# Patient Record
Sex: FEMALE | Race: WHITE | NOT HISPANIC OR LATINO
[De-identification: names, ages, dates, MRNs, and addresses within clinical notes are randomized per-mention and may not be internally consistent; named-entity substitution may affect disease eponyms.]

---

## 2022-04-20 ENCOUNTER — APPOINTMENT (OUTPATIENT)
Dept: ORTHOPEDIC SURGERY | Facility: CLINIC | Age: 52
End: 2022-04-20
Payer: COMMERCIAL

## 2022-04-20 ENCOUNTER — APPOINTMENT (OUTPATIENT)
Dept: MRI IMAGING | Facility: CLINIC | Age: 52
End: 2022-04-20
Payer: COMMERCIAL

## 2022-04-20 VITALS — WEIGHT: 150 LBS | HEIGHT: 66 IN | BODY MASS INDEX: 24.11 KG/M2

## 2022-04-20 DIAGNOSIS — Z87.891 PERSONAL HISTORY OF NICOTINE DEPENDENCE: ICD-10-CM

## 2022-04-20 DIAGNOSIS — M50.90 CERVICAL DISC DISORDER, UNSPECIFIED, UNSPECIFIED CERVICAL REGION: ICD-10-CM

## 2022-04-20 DIAGNOSIS — Z78.9 OTHER SPECIFIED HEALTH STATUS: ICD-10-CM

## 2022-04-20 PROBLEM — Z00.00 ENCOUNTER FOR PREVENTIVE HEALTH EXAMINATION: Status: ACTIVE | Noted: 2022-04-20

## 2022-04-20 PROCEDURE — 72141 MRI NECK SPINE W/O DYE: CPT

## 2022-04-20 PROCEDURE — 99204 OFFICE O/P NEW MOD 45 MIN: CPT

## 2022-04-20 RX ORDER — METHYLPREDNISOLONE 4 MG/1
4 TABLET ORAL
Qty: 1 | Refills: 0 | Status: ACTIVE | COMMUNITY
Start: 2022-04-20 | End: 1900-01-01

## 2022-04-20 RX ORDER — NAPROXEN 500 MG/1
500 TABLET ORAL
Refills: 0 | Status: ACTIVE | COMMUNITY

## 2022-04-20 RX ORDER — METAXALONE 800 MG/1
800 TABLET ORAL
Refills: 0 | Status: ACTIVE | COMMUNITY

## 2022-04-20 NOTE — HISTORY OF PRESENT ILLNESS
[Neck] : neck [Upper back] : upper back [Left Arm] : left arm [Gradual] : gradual [Result of repetitive motion] : result of repetitive motion [9] : 9 [6] : 6 [Burning] : burning [Dull/Aching] : dull/aching [Radiating] : radiating [Shooting] : shooting [Constant] : constant [Nothing helps with pain getting better] : Nothing helps with pain getting better [Standing] : standing [de-identified] : History of c spine and l spine pain, saw Pain mng in the past and had Epidural more tan 5 years ago \par Pain start again and went to [facility], Had Xray on 4/10/22, went to  and Came back 2-3 days ago \par \par Ms. BRADEN is a 51 year female who presents today for evaluation of their Neck pain and bilateral arm pain and left sided arm numbness. She does have a history of herniated discs in the cervical spine which were managed by numerous injections by a pain management doctor a few years ago. She feels that a few days ago she was cleaning and doing more activities around her home and woke up the next day with moderate to severe neck and back pain radiating into her upper extremities. She does feel that the pain has minimally improved with an anti inflammatory any muscle relaxant. She denies any change in vision she denies any bowel or bladder dysfunction or any tripping or falling. She does feel a sense of numbness in her upper extremities.\par  [] : no [FreeTextEntry1] : shoulder [de-identified] : xray

## 2022-04-20 NOTE — PHYSICAL EXAM
[Flexion] : flexion [Extension] : extension [Rotation to left] : rotation to left [Rotation to right] : rotation to right [4___] : right grasp 4[unfilled]/5 [] : sensation of right upper extremities intact [Biceps 2+] : biceps 2+ [Triceps 2+] : triceps 2+ [Brachioradialis 2+] : brachioradialis 2+ [FreeTextEntry1] : straightening of the c-spine wit facet arthropathy  and disc space narrowing of c4-5, 5-6, 6-7

## 2022-04-20 NOTE — ASSESSMENT
[FreeTextEntry1] : After her examination today in the office and review of her radiographs I do think she has a re exacerbating of her neck pain and she does have a history of multiple herniated discs in her cervical spine however she does not have any history of any new trauma or injury to the neck or lower back however as a result of her nerve symptoms I would like to order a stat MRI and have her follow up with a spine specialist who can review the MRI with her and discuss either conservative treatment with cortisone injections versus discussing with her surgical intervention which may include discectomy's and stabilization or fusion of the cervical spine if necessary. I've given her a prescription for a medrol dose pack as she has tried anti inflammatory muscle relaxants which have minimally improved her symptoms and she will be scheduled for a stat MRI as well as a spine follow up over the next day or two period if her pain worsens or if she begins to have diffus weakness in her upper extremities or lower extremities or any bowel or bladder dysfunctions or change in her vision or significantly worsening pain than she should present to the emergency room

## 2022-04-21 ENCOUNTER — APPOINTMENT (OUTPATIENT)
Dept: ORTHOPEDIC SURGERY | Facility: CLINIC | Age: 52
End: 2022-04-21
Payer: COMMERCIAL

## 2022-04-21 VITALS — BODY MASS INDEX: 23.54 KG/M2 | HEIGHT: 67 IN | WEIGHT: 150 LBS

## 2022-04-21 DIAGNOSIS — M54.12 RADICULOPATHY, CERVICAL REGION: ICD-10-CM

## 2022-04-21 PROCEDURE — 99214 OFFICE O/P EST MOD 30 MIN: CPT

## 2022-04-21 PROCEDURE — 99204 OFFICE O/P NEW MOD 45 MIN: CPT

## 2022-04-21 RX ORDER — GABAPENTIN 100 MG/1
100 CAPSULE ORAL 3 TIMES DAILY
Qty: 90 | Refills: 1 | Status: ACTIVE | COMMUNITY
Start: 2022-04-21 | End: 1900-01-01

## 2022-04-21 RX ORDER — NAPROXEN 500 MG/1
500 TABLET ORAL
Qty: 60 | Refills: 0 | Status: ACTIVE | COMMUNITY
Start: 2022-04-21 | End: 1900-01-01

## 2022-04-21 NOTE — ASSESSMENT
[FreeTextEntry1] : LUE radic\par C/w medrol, switch to claude, NSAIDs after MDP\par Pain c/s to discuss [facility] again\par Re-initiate PT\par \par Gabapentin- Patient advised of sedating effects, instructed not to drive, operate machinery, or take with other sedating medications. Advised of need to taper on/off medication and risk of abruptly stopping gabapentin. \par NSAIDs- Patient warned of risk of medication to GI tract, increased blood pressure, cardiac risk, and risk of fluid retention.  Advised to clear medication with internist or PCP if any concurrent health problem with heart, blood pressure, or GI system exists.

## 2022-04-21 NOTE — REVIEW OF SYSTEMS
[Joint Pain] : joint pain [Negative] : Endocrine [Arthralgia] : no arthralgia [Joint Stiffness] : no joint stiffness [Joint Swelling] : no joint swelling [Chills] : no chills

## 2022-04-21 NOTE — HISTORY OF PRESENT ILLNESS
[10] : 10 [de-identified] : C MRI 4/20/22- no formal report available yet.\par Ind. review- \par C4/5 bugle with central HNP and b/l NF narrowing;\par C5/6 bulge with L paracentral and R foraminal HNP and severe NF narrowing;\par C6/7 bulge with L paracentral component and severe L>R NF narrowing\par \par Pt seen by non-spine partners in the past \par \par 04/21/2022 - 51 year old  RHD female presents for constant worsening severe neck pain radiating down LUE to fingers, with associated N/T, X 12 days. Patient woke up with pain; denies specific injury. Previously seen by Dr. Todd on 4/20/22, who ordered MRI completed last night.  Unable to sleep due to pain. Has had HILARY with Dr. Browne in 2015/Physical therapy several years ago for a similar pain episode, with relief. Denies prior cervical injuries/surgeries. \par No PMH/PSH. No t/drugs. Occ etoh.  [FreeTextEntry1] : C-SPINE

## 2022-04-21 NOTE — IMAGING
[de-identified] : Inspection: No rash or ecchymosis \par Palpation: Midline cervical and b/l rhomboid tenderness to palpation. L trapezial TTP.  \par ROM: Diminished in all planes. \par Strength: 5/5 bilateral deltoid, biceps, triceps, wrist flexors, wrist extensors, , abductors \par Sensation: Sensation present to light touch bilateral C5-T1 distributions \par Reflexes: Negative Rodriguez's bilaterally\par Shoulders: Full ROM without pain. \par

## 2022-04-26 ENCOUNTER — APPOINTMENT (OUTPATIENT)
Dept: PAIN MANAGEMENT | Facility: CLINIC | Age: 52
End: 2022-04-26
Payer: COMMERCIAL

## 2022-04-26 VITALS — BODY MASS INDEX: 23.23 KG/M2 | WEIGHT: 148 LBS | HEIGHT: 67 IN

## 2022-04-26 PROCEDURE — 99204 OFFICE O/P NEW MOD 45 MIN: CPT

## 2022-04-26 NOTE — HISTORY OF PRESENT ILLNESS
[Neck] : neck [9] : 9 [Sharp] : sharp [Tingling] : tingling [Constant] : constant [Meds] : meds [Sitting] : sitting [Standing] : standing [Walking] : walking [FreeTextEntry1] : Neck pain with radiation to LUE. Pain began approx. APril 8th , sudden onset, no known trigger event. Prior episode approx. 7 years ago had an injection ( epidural ) by me and was well until this episode began. She has used topical ketamine cream in the past which has been helpful but she is essentially out of it prescribed 7 years ago. New MRI reviewed. Pain is worse with sitting and driving. Supine and topical meds improve it. MDP was of little help. Tylenol is of some help. Gabapentin helps as well.  [] : Post Surgical Visit: no [FreeTextEntry6] : numbness  [FreeTextEntry7] : left side  [FreeTextEntry9] : laying down  [de-identified] : 7 years ago  [de-identified] : 7 years ago  [de-identified] : mri

## 2022-04-26 NOTE — CONSULT LETTER
[Dear  ___] : Dear  [unfilled], [Consult Letter:] : I had the pleasure of evaluating your patient, [unfilled]. [Please see my note below.] : Please see my note below. [Consult Closing:] : Thank you very much for allowing me to participate in the care of this patient.  If you have any questions, please do not hesitate to contact me. [Sincerely,] : Sincerely, [FreeTextEntry3] : Charles Browne MD\par

## 2022-04-26 NOTE — DISCUSSION/SUMMARY
[Surgical risks reviewed] : Surgical risks reviewed [de-identified] : After discussing various treatment options with the patient including but not limited to oral medications, physical therapy, exercise,\par modalities as well as interventional spinal injections, we have decided with the following plan\par \par I personally reviewed the MRI/CT scan images and agree with the radiologist's report. The\par radiological findings were discussed with the patient.\par \par \par The risks, benefits, contents and alternatives to injection were explained in full to the patient. Risks outlined include but are not\par limited to infection,sepsis, bleeding, post-dural puncture headache, nerve damage, temporary increase in pain, syncopal episode,\par failure to resolve symptoms, allergic reaction, symptom recurrence, and elevation of blood sugar in diabetics. Cortisone may cause\par immunosuppression. Patient understands the risks. All questions were answered. After discussion of options, patient requested\par an injection. Information regarding the injection was given to the patient. Which medications to stop prior to the injection was\par explained to the patient as well.\par \par Follow up in 1-2 weeks post injection for re-evaluation.\par \par  Conservative Care\par Continue Home exercises, stretching, activity modification, physical therapy, and conservative care.\par

## 2022-04-26 NOTE — PROCEDURE
[Trigger point 1-2 muscle groups] : trigger point 1-2 muscle groups [Bilateral] : bilaterally of the [Cervical paraspinal muscle] : cervical paraspinal muscle [Trapezius muscle] : trapezius muscle [Pain] : pain [Inflammation] : inflammation [Alcohol] : alcohol [Ethyl Chloride sprayed topically] : ethyl chloride sprayed topically [Sterile technique used] : sterile technique used [___ cc    1%] : Lidocaine ~Vcc of 1%  [___ cc    10mg] : Triamcinolone (Kenalog) ~Vcc of 10 mg  [] : Patient tolerated procedure well [Call if redness, pain or fever occur] : call if redness, pain or fever occur

## 2022-04-26 NOTE — PHYSICAL EXAM
[Normal Coordination] : normal coordination [Normal DTR UE/LE] : normal DTR UE/LE  [Normal Sensation] : normal sensation [Normal Mood and Affect] : normal mood and affect [Orientated] : orientated [Able to Communicate] : able to communicate [Normal Skin] : normal skin [No Rash] : no rash [No Ulcers] : no ulcers [No Lesions] : no lesions [No obvious lymphadenopathy in areas examined] : no obvious lymphadenopathy in areas examined [Well Developed] : well developed [Well Nourished] : well nourished [No Respiratory Distress] : no respiratory distress [Rotation to left] : rotation to left [] : positive Spurling

## 2022-04-27 ENCOUNTER — APPOINTMENT (OUTPATIENT)
Dept: PAIN MANAGEMENT | Facility: CLINIC | Age: 52
End: 2022-04-27
Payer: COMMERCIAL

## 2022-04-27 PROCEDURE — 62321 NJX INTERLAMINAR CRV/THRC: CPT

## 2022-04-27 NOTE — PROCEDURE
[FreeTextEntry3] : Date of Service: 04/27/2022 \par \par Account: [id number] \par \par Patient: DI BRADEN \par \par YOB: 1970 \par \par Age: 51 year \par \par Surgeon:                                                      Charles Browne M.D.\par \par Pre-Operative Diagnosis:                         Cervical Radiculopathy (M54.12) \par \par Post Operative Diagnosis:                       Cervical Radiculopathy (M54.12)\par \par Procedure:                                                  Interlaminar cervical epidural steroid injection (C7-T1) under fluoroscopic guidance\par \par Anesthesia:                                                 MAC\par \par \par This procedure was carried out using fluoroscopic guidance.  The risks and benefits of the procedure were discussed extensively with the patient.  The consent of the patient was obtained and the following procedure was performed.\par \par The patient was placed in the prone position using a thoracic and chin support.  The cervical area was prepped and draped in a sterile fashion.  The fluoroscope visualized the C7-T1 interspace using slight cephalad-caudad angulation and this area was marked.  Using sterile technique the superficial skin was anesthetized with 1% Lidocaine without epinephrine.  A 18 gauge Tuohoy needle was advanced under fluoroscopy using jijiu-keayohpwz-ryrow technique until ligament was engaged.  The stilette was then removed and a column of preservative free normal saline flushed throught the tuohoy needle and left with a concave fluid level above the butterfly portion of the tuohoy needle.  The needle was then advanced under fluoroscopic guidance until the column of saline disappeared.  Lateral view confirmed final needle tip placement in the epidural space.  After negative aspiration for heme and CSF, 1 cc of Omnipaque confirmed good cervical epiduragram.  \par \par Cervical epidurogram showed no evidence of intrathecal or intravascular flow, and good bilateral epidural flow from C3 to T2 levels.  An injectate of 6 cc of preservative free normal saline plus 12 mg of betamethasone was then injected into the epidural space. The needle was subsequently removed and pressure was applied.\par \par Anesthesia personnel were present throughout the procedure.  The patient tolerated the procedure well and was instructed to contact me immediately if there were any problems.\par \par Charles Browne M.D.\par

## 2022-05-09 ENCOUNTER — APPOINTMENT (OUTPATIENT)
Dept: ORTHOPEDIC SURGERY | Facility: CLINIC | Age: 52
End: 2022-05-09

## 2022-05-11 ENCOUNTER — APPOINTMENT (OUTPATIENT)
Dept: PAIN MANAGEMENT | Facility: CLINIC | Age: 52
End: 2022-05-11
Payer: COMMERCIAL

## 2022-05-11 VITALS — HEIGHT: 67 IN | BODY MASS INDEX: 24.01 KG/M2 | WEIGHT: 153 LBS

## 2022-05-11 DIAGNOSIS — M54.12 RADICULOPATHY, CERVICAL REGION: ICD-10-CM

## 2022-05-11 DIAGNOSIS — M54.2 CERVICALGIA: ICD-10-CM

## 2022-05-11 PROCEDURE — 99214 OFFICE O/P EST MOD 30 MIN: CPT

## 2022-05-11 NOTE — ASSESSMENT
[FreeTextEntry1] : Pt reports 50-60% relief of radicular pain, axial pain is much improved >85-90%. Will proceed with repeat injection for added relief, and start PT

## 2022-05-11 NOTE — HISTORY OF PRESENT ILLNESS
[Neck] : neck [Dull/Aching] : dull/aching [Tingling] : tingling [Frequent] : frequent [Household chores] : household chores [Leisure] : leisure [Work] : work [Social interactions] : social interactions [Rest] : rest [Injection therapy] : injection therapy [Sitting] : sitting [] : no

## 2022-05-11 NOTE — DISCUSSION/SUMMARY
[Surgical risks reviewed] : Surgical risks reviewed [de-identified] : After discussing various treatment options with the patient including but not limited to oral medications, physical therapy, exercise,\par modalities as well as interventional spinal injections, we have decided with the following plan\par \par I personally reviewed the MRI/CT scan images and agree with the radiologist's report. The\par radiological findings were discussed with the patient.\par \par \par The risks, benefits, contents and alternatives to injection were explained in full to the patient. Risks outlined include but are not\par limited to infection,sepsis, bleeding, post-dural puncture headache, nerve damage, temporary increase in pain, syncopal episode,\par failure to resolve symptoms, allergic reaction, symptom recurrence, and elevation of blood sugar in diabetics. Cortisone may cause\par immunosuppression. Patient understands the risks. All questions were answered. After discussion of options, patient requested\par an injection. Information regarding the injection was given to the patient. Which medications to stop prior to the injection was\par explained to the patient as well.\par \par Follow up in 1-2 weeks post injection for re-evaluation.\par \par  Conservative Care\par Continue Home exercises, stretching, activity modification, physical therapy, and conservative care.\par \par \par C7-T1 HILARY

## 2022-05-13 ENCOUNTER — APPOINTMENT (OUTPATIENT)
Dept: PAIN MANAGEMENT | Facility: CLINIC | Age: 52
End: 2022-05-13
Payer: COMMERCIAL

## 2022-05-13 PROCEDURE — 62321 NJX INTERLAMINAR CRV/THRC: CPT

## 2022-05-13 NOTE — PROCEDURE
[FreeTextEntry3] : Date of Service: 05/13/2022 \par \par Account: [id number] \par \par Patient: DI BRADEN \par \par YOB: 1970 \par \par Age: 51 year \par \par Surgeon:                                                      Charles Browne M.D.\par \par Pre-Operative Diagnosis:                         Cervical Radiculopathy (M54.12) \par \par Post Operative Diagnosis:                       Cervical Radiculopathy (M54.12)\par \par Procedure:                                                  Interlaminar cervical epidural steroid injection (C7-T1) under fluoroscopic guidance\par \par Anesthesia:                                                 MAC\par \par \par This procedure was carried out using fluoroscopic guidance.  The risks and benefits of the procedure were discussed extensively with the patient.  The consent of the patient was obtained and the following procedure was performed.\par \par The patient was placed in the prone position using a thoracic and chin support.  The cervical area was prepped and draped in a sterile fashion.  The fluoroscope visualized the C7-T1 interspace using slight cephalad-caudad angulation and this area was marked.  Using sterile technique the superficial skin was anesthetized with 1% Lidocaine without epinephrine.  A 18 gauge Tuohoy needle was advanced under fluoroscopy using [id number]-ndjvy technique until ligament was engaged.  The stilette was then removed and a column of preservative free normal saline flushed throught the tuohoy needle and left with a concave fluid level above the butterfly portion of the tuohoy needle.  The needle was then advanced under fluoroscopic guidance until the column of saline disappeared.  Lateral view confirmed final needle tip placement in the epidural space.  After negative aspiration for heme and CSF, 1 cc of Omnipaque confirmed good cervical epiduragram.  \par \par Cervical epidurogram showed no evidence of intrathecal or intravascular flow, and good bilateral epidural flow from C3 to T2 levels.  An injectate of 6 cc of preservative free normal saline plus 12 mg of betamethasone was then injected into the epidural space. The needle was subsequently removed and pressure was applied.\par \par Anesthesia personnel were present throughout the procedure.  The patient tolerated the procedure well and was instructed to contact me immediately if there were any problems.\par \par Charles Browne M.D.\par

## 2022-05-23 ENCOUNTER — APPOINTMENT (OUTPATIENT)
Dept: PAIN MANAGEMENT | Facility: CLINIC | Age: 52
End: 2022-05-23

## 2022-05-26 RX ORDER — TRAMADOL HYDROCHLORIDE AND ACETAMINOPHEN 37.5; 325 MG/1; MG/1
37.5-325 TABLET, FILM COATED ORAL
Qty: 28 | Refills: 0 | Status: ACTIVE | COMMUNITY
Start: 2022-05-26 | End: 1900-01-01

## 2022-06-22 ENCOUNTER — APPOINTMENT (OUTPATIENT)
Dept: PAIN MANAGEMENT | Facility: CLINIC | Age: 52
End: 2022-06-22
Payer: COMMERCIAL

## 2022-06-22 VITALS — WEIGHT: 153 LBS | HEIGHT: 67 IN | BODY MASS INDEX: 24.01 KG/M2

## 2022-06-22 PROCEDURE — 99214 OFFICE O/P EST MOD 30 MIN: CPT

## 2022-06-22 NOTE — HISTORY OF PRESENT ILLNESS
[Neck] : neck [5] : 5 [3] : 3 [Burning] : burning [Sharp] : sharp [Shooting] : shooting [Stabbing] : stabbing [Constant] : constant [Household chores] : household chores [Rest] : rest [Meds] : meds [Nothing helps with pain getting better] : Nothing helps with pain getting better [Sitting] : sitting [Standing] : standing [Walking] : walking [] : no [FreeTextEntry6] : numbness [FreeTextEntry7] : left shoulder  [de-identified] : c mri  [TWNoteComboBox1] : 50%

## 2022-06-22 NOTE — DISCUSSION/SUMMARY
[Surgical risks reviewed] : Surgical risks reviewed [de-identified] : After discussing various treatment options with the patient including but not limited to oral medications, physical therapy, exercise,\par modalities as well as interventional spinal injections, we have decided with the following plan\par \par I personally reviewed the MRI/CT scan images and agree with the radiologist's report. The\par radiological findings were discussed with the patient.\par \par \par The risks, benefits, contents and alternatives to injection were explained in full to the patient. Risks outlined include but are not\par limited to infection,sepsis, bleeding, post-dural puncture headache, nerve damage, temporary increase in pain, syncopal episode,\par failure to resolve symptoms, allergic reaction, symptom recurrence, and elevation of blood sugar in diabetics. Cortisone may cause\par immunosuppression. Patient understands the risks. All questions were answered. After discussion of options, patient requested\par an injection. Information regarding the injection was given to the patient. Which medications to stop prior to the injection was\par explained to the patient as well.\par \par Follow up in 1-2 weeks post injection for re-evaluation.\par \par  Conservative Care\par Continue Home exercises, stretching, activity modification, physical therapy, and conservative care.\par \par \par Will proceed with HILARY #3 at C7-T1 rising to C6-7 will be avoided since this is a level of significant stenosis. Will also obtain surgical input and start PT

## 2022-06-22 NOTE — ASSESSMENT
[FreeTextEntry1] : Pt reports overall approx. 50-60% relief, she still has significant radicular pain in LUE. She has not yet done any PT. MRI reviewed.

## 2022-06-22 NOTE — PROCEDURE
[Therapeutic Injection] : therapeutic injection [Right] : of the right [___ cc    30mg] : Ketorolac (Toradol) ~Vcc of 30 mg  [Risks, benefits, alternatives discussed / Verbal consent obtained] : the risks benefits, and alternatives have been discussed, and verbal consent was obtained [FreeTextEntry3] : No allergies confirmed NKDA, given in R deltoid.